# Patient Record
Sex: MALE | Race: OTHER | NOT HISPANIC OR LATINO | URBAN - METROPOLITAN AREA
[De-identification: names, ages, dates, MRNs, and addresses within clinical notes are randomized per-mention and may not be internally consistent; named-entity substitution may affect disease eponyms.]

---

## 2019-09-21 ENCOUNTER — EMERGENCY (EMERGENCY)
Facility: HOSPITAL | Age: 49
LOS: 1 days | Discharge: HOME | End: 2019-09-21
Attending: EMERGENCY MEDICINE
Payer: COMMERCIAL

## 2019-09-21 VITALS
TEMPERATURE: 98 F | RESPIRATION RATE: 18 BRPM | WEIGHT: 115.08 LBS | OXYGEN SATURATION: 100 % | HEIGHT: 66 IN | DIASTOLIC BLOOD PRESSURE: 92 MMHG | HEART RATE: 54 BPM | SYSTOLIC BLOOD PRESSURE: 150 MMHG

## 2019-09-21 PROCEDURE — 73130 X-RAY EXAM OF HAND: CPT | Mod: 26,RT

## 2019-09-21 PROCEDURE — 99284 EMERGENCY DEPT VISIT MOD MDM: CPT | Mod: 25

## 2019-09-21 PROCEDURE — 73130 X-RAY EXAM OF HAND: CPT

## 2019-09-21 PROCEDURE — 99283 EMERGENCY DEPT VISIT LOW MDM: CPT

## 2019-09-21 PROCEDURE — 73140 X-RAY EXAM OF FINGER(S): CPT

## 2019-09-21 NOTE — ED PROVIDER NOTE - OBJECTIVE STATEMENT
50 y/o M patient presents to the ED w/ R hand pain to 1st MCP joint due to hand injury that occurred yesterday. Patient states he cannot move thumb normally and pain increased when moving R thumb up. NKDA.

## 2019-09-21 NOTE — ED PROVIDER NOTE - NSFOLLOWUPINSTRUCTIONS_ED_ALL_ED_FT
Take Tylenol/Ibuprofen as needed for pains/fevers.  Drink plenty of fluids.  Follow up with your doctor or Hand Surgeon as discussed within 7-10 days if pains persist.  Return to the ER for any concerns.

## 2019-09-21 NOTE — ED PROVIDER NOTE - CARE PROVIDER_API CALL
Ronald Mata (MD)  Surgery; Surgery of the Hand  [facility] [place] [address]  Phone: [phone]  Fax: [phone]  Follow Up Time:

## 2019-09-21 NOTE — ED ADULT NURSE NOTE - NSIMPLEMENTINTERV_GEN_ALL_ED
Implemented All Universal Safety Interventions:  [place] to call system. Call bell, personal items and telephone within reach. Instruct patient to call for assistance. Room bathroom lighting operational. Non-slip footwear when patient is off stretcher. Physically safe environment: no spills, clutter or unnecessary equipment. Stretcher in lowest position, wheels locked, appropriate side rails in place.

## 2019-09-21 NOTE — ED PROVIDER NOTE - CLINICAL SUMMARY MEDICAL DECISION MAKING FREE TEXT BOX
50 y/o M patient presents to the ED w/ R 1st MCP joint pain due to hand injury. Will get X-Ray, treat pain, and re-evaluate.

## 2019-09-21 NOTE — ED PROVIDER NOTE - PATIENT PORTAL LINK FT
You can access the FollowMyHealth Patient Portal offered by [facility] by registering at the following website: [email or web address]. By joining [facility]’s FollowMyHealth portal, you will also be able to view your health information using other applications (apps) compatible with our system.
